# Patient Record
Sex: MALE
[De-identification: names, ages, dates, MRNs, and addresses within clinical notes are randomized per-mention and may not be internally consistent; named-entity substitution may affect disease eponyms.]

---

## 2024-04-24 ENCOUNTER — APPOINTMENT (OUTPATIENT)
Dept: UROLOGY | Facility: CLINIC | Age: 34
End: 2024-04-24
Payer: COMMERCIAL

## 2024-04-24 ENCOUNTER — NON-APPOINTMENT (OUTPATIENT)
Age: 34
End: 2024-04-24

## 2024-04-24 VITALS
SYSTOLIC BLOOD PRESSURE: 106 MMHG | DIASTOLIC BLOOD PRESSURE: 70 MMHG | WEIGHT: 164 LBS | BODY MASS INDEX: 22.21 KG/M2 | OXYGEN SATURATION: 95 % | TEMPERATURE: 97.3 F | HEIGHT: 72 IN | HEART RATE: 73 BPM

## 2024-04-24 DIAGNOSIS — N48.9 DISORDER OF PENIS, UNSPECIFIED: ICD-10-CM

## 2024-04-24 PROBLEM — Z00.00 ENCOUNTER FOR PREVENTIVE HEALTH EXAMINATION: Status: ACTIVE | Noted: 2024-04-24

## 2024-04-24 PROCEDURE — 99203 OFFICE O/P NEW LOW 30 MIN: CPT

## 2024-04-24 NOTE — PHYSICAL EXAM
[General Appearance - In No Acute Distress] : no acute distress [] : no respiratory distress [Oriented To Time, Place, And Person] : oriented to person, place, and time [de-identified] : Small 1-2 bump at base of penis on right side, mobile with skin. No erythema, swelling or pain.

## 2024-04-24 NOTE — HISTORY OF PRESENT ILLNESS
[FreeTextEntry1] : 33 year old male presents with abnormal lump on base of penis x 2 weeks. Not painful. Denies urinary issues, no dysuria or gross hematuria. Feels like it is underneath the skin and moves with the skin. Has decreased in size over the last few days. No open lesion or sores. No drainage. No concern for STI.

## 2024-04-24 NOTE — ASSESSMENT
[FreeTextEntry1] : 33 year old male with very small, barely palpable lesion at base of penis. It is resolving on its own with time. Continue to observe and return to clinic if increasing in size, painful, drainage, fevers, chills or other signs of infection. Most likely ingrown hair vs cyst vs other benign skin etiology.